# Patient Record
Sex: MALE | Race: WHITE | URBAN - METROPOLITAN AREA
[De-identification: names, ages, dates, MRNs, and addresses within clinical notes are randomized per-mention and may not be internally consistent; named-entity substitution may affect disease eponyms.]

---

## 2022-04-05 ENCOUNTER — OFFICE VISIT (OUTPATIENT)
Dept: URBAN - METROPOLITAN AREA CLINIC 23 | Facility: CLINIC | Age: 57
End: 2022-04-05
Payer: COMMERCIAL

## 2022-04-05 DIAGNOSIS — H16.223 KERATOCONJUNCTIVITIS SICCA, NOT SPECIFIED AS SJÖGREN'S, BILATERAL: Primary | ICD-10-CM

## 2022-04-05 DIAGNOSIS — H43.813 VITREOUS DEGENERATION, BILATERAL: ICD-10-CM

## 2022-04-05 PROCEDURE — 99204 OFFICE O/P NEW MOD 45 MIN: CPT | Performed by: OPTOMETRIST

## 2022-04-05 ASSESSMENT — INTRAOCULAR PRESSURE
OD: 18
OS: 18

## 2022-04-05 NOTE — IMPRESSION/PLAN
Impression: Vitreous degeneration, bilateral: H43.813. Plan: Posterior vitreous detachment accounts for the patient's complaints. There is no evidence of retinal pathology. All signs and risks of retinal detachment and tears were discussed in detail. Patient instructed to call the office immediately with any symptoms noted.

## 2023-04-10 ENCOUNTER — OFFICE VISIT (OUTPATIENT)
Dept: URBAN - METROPOLITAN AREA CLINIC 23 | Facility: CLINIC | Age: 58
End: 2023-04-10
Payer: COMMERCIAL

## 2023-04-10 DIAGNOSIS — H52.13 MYOPIA, BILATERAL: ICD-10-CM

## 2023-04-10 DIAGNOSIS — H25.043 POSTERIOR SUBCAPSULAR POLAR AGE-RELATED CATARACT, BILATERAL: Primary | ICD-10-CM

## 2023-04-10 PROCEDURE — 99213 OFFICE O/P EST LOW 20 MIN: CPT | Performed by: OPTOMETRIST

## 2023-04-10 ASSESSMENT — VISUAL ACUITY
OD: 20/25
OS: 20/25

## 2023-04-10 ASSESSMENT — INTRAOCULAR PRESSURE
OS: 23
OD: 23

## 2023-04-10 ASSESSMENT — KERATOMETRY
OS: 42.63
OD: 41.88

## 2023-04-10 NOTE — IMPRESSION/PLAN
Impression: Posterior subcapsular polar age-related cataract, bilateral: H25.043. Plan: Discussed diagnosis in detail with patient. Cataracts affecting vision some, but no surgery is currently recommended. The patient will monitor vision changes and contact us with any decrease in vision. Continue to monitor.

## 2024-04-29 ENCOUNTER — OFFICE VISIT (OUTPATIENT)
Dept: URBAN - METROPOLITAN AREA CLINIC 23 | Facility: CLINIC | Age: 59
End: 2024-04-29
Payer: COMMERCIAL

## 2024-04-29 DIAGNOSIS — H43.813 VITREOUS DEGENERATION, BILATERAL: ICD-10-CM

## 2024-04-29 DIAGNOSIS — H25.043 POSTERIOR SUBCAPSULAR POLAR AGE-RELATED CATARACT, BILATERAL: Primary | ICD-10-CM

## 2024-04-29 PROCEDURE — 99213 OFFICE O/P EST LOW 20 MIN: CPT | Performed by: OPTOMETRIST

## 2024-04-29 ASSESSMENT — KERATOMETRY
OS: 42.63
OD: 41.88

## 2024-04-29 ASSESSMENT — INTRAOCULAR PRESSURE
OS: 22
OD: 22

## 2024-04-29 ASSESSMENT — VISUAL ACUITY
OD: 20/25
OS: 20/25

## 2025-05-06 ENCOUNTER — OFFICE VISIT (OUTPATIENT)
Dept: URBAN - METROPOLITAN AREA CLINIC 23 | Facility: CLINIC | Age: 60
End: 2025-05-06
Payer: COMMERCIAL

## 2025-05-06 DIAGNOSIS — H43.813 VITREOUS DEGENERATION, BILATERAL: ICD-10-CM

## 2025-05-06 DIAGNOSIS — H25.043 POSTERIOR SUBCAPSULAR POLAR AGE-RELATED CATARACT, BILATERAL: Primary | ICD-10-CM

## 2025-05-06 PROCEDURE — 99213 OFFICE O/P EST LOW 20 MIN: CPT | Performed by: OPTOMETRIST

## 2025-05-06 ASSESSMENT — KERATOMETRY
OS: 42.63
OD: 42.25

## 2025-05-06 ASSESSMENT — INTRAOCULAR PRESSURE
OD: 22
OS: 21

## 2025-05-13 ENCOUNTER — OFFICE VISIT (OUTPATIENT)
Dept: URBAN - METROPOLITAN AREA CLINIC 23 | Facility: CLINIC | Age: 60
End: 2025-05-13

## 2025-05-13 DIAGNOSIS — H52.4 PRESBYOPIA: Primary | ICD-10-CM

## 2025-05-13 ASSESSMENT — VISUAL ACUITY
OD: 20/20
OS: 20/20

## 2025-05-13 ASSESSMENT — KERATOMETRY
OS: 42.63
OD: 41.88